# Patient Record
Sex: FEMALE | Race: OTHER
[De-identification: names, ages, dates, MRNs, and addresses within clinical notes are randomized per-mention and may not be internally consistent; named-entity substitution may affect disease eponyms.]

---

## 2018-03-15 ENCOUNTER — HOSPITAL ENCOUNTER (OUTPATIENT)
Dept: HOSPITAL 47 - ORWHC2ENDO | Age: 33
Discharge: HOME | End: 2018-03-15
Payer: COMMERCIAL

## 2018-03-15 VITALS — HEART RATE: 60 BPM

## 2018-03-15 VITALS — BODY MASS INDEX: 31.3 KG/M2

## 2018-03-15 VITALS — SYSTOLIC BLOOD PRESSURE: 110 MMHG | DIASTOLIC BLOOD PRESSURE: 71 MMHG

## 2018-03-15 VITALS — RESPIRATION RATE: 16 BRPM

## 2018-03-15 VITALS — TEMPERATURE: 98.1 F

## 2018-03-15 DIAGNOSIS — Z79.890: ICD-10-CM

## 2018-03-15 DIAGNOSIS — F32.9: ICD-10-CM

## 2018-03-15 DIAGNOSIS — K52.9: Primary | ICD-10-CM

## 2018-03-15 DIAGNOSIS — Z88.5: ICD-10-CM

## 2018-03-15 DIAGNOSIS — E07.9: ICD-10-CM

## 2018-03-15 DIAGNOSIS — Z79.899: ICD-10-CM

## 2018-03-15 LAB
ALBUMIN SERPL-MCNC: 3.9 G/DL (ref 3.5–5)
ALP SERPL-CCNC: 44 U/L (ref 38–126)
ALT SERPL-CCNC: 36 U/L (ref 9–52)
ANION GAP SERPL CALC-SCNC: 9 MMOL/L
AST SERPL-CCNC: 36 U/L (ref 14–36)
BUN SERPL-SCNC: 13 MG/DL (ref 7–17)
CALCIUM SPEC-MCNC: 9.4 MG/DL (ref 8.4–10.2)
CHLORIDE SERPL-SCNC: 107 MMOL/L (ref 98–107)
CO2 SERPL-SCNC: 24 MMOL/L (ref 22–30)
ERYTHROCYTE [DISTWIDTH] IN BLOOD BY AUTOMATED COUNT: 4.24 M/UL (ref 3.8–5.4)
ERYTHROCYTE [DISTWIDTH] IN BLOOD: 12.4 % (ref 11.5–15.5)
GLIADIN IGA SER-ACNC: 0.3 U/ML
GLUCOSE SERPL-MCNC: 73 MG/DL (ref 74–99)
HCT VFR BLD AUTO: 39.3 % (ref 34–46)
HGB BLD-MCNC: 13 GM/DL (ref 11.4–16)
MCH RBC QN AUTO: 30.6 PG (ref 25–35)
MCHC RBC AUTO-ENTMCNC: 33 G/DL (ref 31–37)
MCV RBC AUTO: 92.6 FL (ref 80–100)
PLATELET # BLD AUTO: 276 K/UL (ref 150–450)
POTASSIUM SERPL-SCNC: 4.5 MMOL/L (ref 3.5–5.1)
PROT SERPL-MCNC: 6.6 G/DL (ref 6.3–8.2)
SODIUM SERPL-SCNC: 140 MMOL/L (ref 137–145)
WBC # BLD AUTO: 8.8 K/UL (ref 3.8–10.6)

## 2018-03-15 PROCEDURE — 45380 COLONOSCOPY AND BIOPSY: CPT

## 2018-03-15 PROCEDURE — 88305 TISSUE EXAM BY PATHOLOGIST: CPT

## 2018-03-15 PROCEDURE — 83516 IMMUNOASSAY NONANTIBODY: CPT

## 2018-03-15 PROCEDURE — 81025 URINE PREGNANCY TEST: CPT

## 2018-03-15 PROCEDURE — 86140 C-REACTIVE PROTEIN: CPT

## 2018-03-15 PROCEDURE — 80053 COMPREHEN METABOLIC PANEL: CPT

## 2018-03-15 PROCEDURE — 85027 COMPLETE CBC AUTOMATED: CPT

## 2018-03-15 PROCEDURE — 85652 RBC SED RATE AUTOMATED: CPT

## 2018-03-15 NOTE — P.PCN
Date of Procedure: 03/15/18


Procedure(s) Performed: 


BRIEF HISTORY: Patient is a 32-year-old pleasant, white female, scheduled for 

an elective colonoscopy as a part of evaluation of chronic diarrhea for the 

last 6 months duration.  She has stool studies have been negative. 





PROCEDURE PERFORMED: Colonoscopy with random biopsies. 





PREOPERATIVE DIAGNOSIS: Diarrhea of 6 months duration.. 





IV sedation per Anesthesia. 





PROCEDURE: After informed consent was obtained, the patient, was brought into 

the endoscopy unit. IV sedation was administered by Anesthesia under continuous 

monitoring.  Digital rectal examination was normal. Initially the Olympus CF-

160 flexible video colonoscope was then inserted in the rectum, gradually 

advanced into the cecum without any difficulty. Careful examination was 

performed as the scope was gradually being withdrawn. Ileocecal valve and the 

appendiceal orifice were visualized and appeared normal.  Prep was excellent.  

Terminal ileum appeared normal.  Mucosa of the cecum, ascending colon, 

transverse colon, descending colon, sigmoid colon, and rectum appeared normal.  

Random biopsies were done from ascending and descending colon to rule out 

microscopic/collagenous colitis.  Retroflexion was performed in the rectum and 

no lesions were seen. The patient tolerated the procedure well. 





IMPRESSION: Normal-appearing colon from rectum to cecum with no evidence of 

colitis or colorectal neoplasia..





RECOMMENDATIONS:  Findings of this examination were discussed with the patient 

as well as her family.  She was advised to follow with the biopsy results.  She 

will be seen in office in 3-4 weeks.

## 2019-03-04 ENCOUNTER — HOSPITAL ENCOUNTER (OUTPATIENT)
Dept: HOSPITAL 47 - LABWHC1 | Age: 34
End: 2019-03-04
Payer: COMMERCIAL

## 2019-03-04 DIAGNOSIS — Z00.00: Primary | ICD-10-CM

## 2019-03-04 LAB
ALBUMIN SERPL-MCNC: 4.6 G/DL (ref 3.8–4.9)
ALBUMIN/GLOB SERPL: 2.19 G/DL (ref 1.6–3.17)
ALP SERPL-CCNC: 59 U/L (ref 41–126)
ALT SERPL-CCNC: 20 U/L (ref 8–44)
ANION GAP SERPL CALC-SCNC: 7.8 MMOL/L (ref 4–12)
AST SERPL-CCNC: 26 U/L (ref 13–35)
BASOPHILS # BLD AUTO: 0.1 K/UL (ref 0–0.2)
BASOPHILS NFR BLD AUTO: 1 %
BUN SERPL-SCNC: 15 MG/DL (ref 9–27)
CALCIUM SPEC-MCNC: 9.5 MG/DL (ref 8.7–10.3)
CHLORIDE SERPL-SCNC: 108 MMOL/L (ref 96–109)
CHOLEST SERPL-MCNC: 200 MG/DL (ref 0–200)
CO2 SERPL-SCNC: 24.2 MMOL/L (ref 21.6–31.8)
EOSINOPHIL # BLD AUTO: 0.2 K/UL (ref 0–0.7)
EOSINOPHIL NFR BLD AUTO: 2 %
ERYTHROCYTE [DISTWIDTH] IN BLOOD BY AUTOMATED COUNT: 4.63 M/UL (ref 3.8–5.4)
ERYTHROCYTE [DISTWIDTH] IN BLOOD: 12.4 % (ref 11.5–15.5)
GLOBULIN SER CALC-MCNC: 2.1 G/DL (ref 1.6–3.3)
GLUCOSE SERPL-MCNC: 81 MG/DL (ref 70–110)
HCT VFR BLD AUTO: 44.2 % (ref 34–46)
HDLC SERPL-MCNC: 55 MG/DL (ref 40–60)
HGB BLD-MCNC: 13.9 GM/DL (ref 11.4–16)
LDLC SERPL CALC-MCNC: 113.2 MG/DL (ref 0–131)
LYMPHOCYTES # SPEC AUTO: 3.5 K/UL (ref 1–4.8)
LYMPHOCYTES NFR SPEC AUTO: 37 %
MCH RBC QN AUTO: 30.1 PG (ref 25–35)
MCHC RBC AUTO-ENTMCNC: 31.5 G/DL (ref 31–37)
MCV RBC AUTO: 95.5 FL (ref 80–100)
MONOCYTES # BLD AUTO: 0.3 K/UL (ref 0–1)
MONOCYTES NFR BLD AUTO: 3 %
NEUTROPHILS # BLD AUTO: 5.4 K/UL (ref 1.3–7.7)
NEUTROPHILS NFR BLD AUTO: 57 %
PLATELET # BLD AUTO: 322 K/UL (ref 150–450)
POTASSIUM SERPL-SCNC: 4.1 MMOL/L (ref 3.5–5.5)
PROT SERPL-MCNC: 6.7 G/DL (ref 6.2–8.2)
SODIUM SERPL-SCNC: 140 MMOL/L (ref 135–145)
TRIGL SERPL-MCNC: 159 MG/DL (ref 0–149)
VLDLC SERPL CALC-MCNC: 31.8 MG/DL (ref 5–40)
WBC # BLD AUTO: 9.6 K/UL (ref 3.8–10.6)

## 2019-03-04 PROCEDURE — 80061 LIPID PANEL: CPT

## 2019-03-04 PROCEDURE — 85025 COMPLETE CBC W/AUTO DIFF WBC: CPT

## 2019-03-04 PROCEDURE — 84443 ASSAY THYROID STIM HORMONE: CPT

## 2019-03-04 PROCEDURE — 36415 COLL VENOUS BLD VENIPUNCTURE: CPT

## 2019-03-04 PROCEDURE — 80053 COMPREHEN METABOLIC PANEL: CPT

## 2019-08-01 ENCOUNTER — HOSPITAL ENCOUNTER (EMERGENCY)
Dept: HOSPITAL 47 - EC | Age: 34
Discharge: HOME | End: 2019-08-01
Payer: COMMERCIAL

## 2019-08-01 VITALS
HEART RATE: 74 BPM | RESPIRATION RATE: 16 BRPM | DIASTOLIC BLOOD PRESSURE: 59 MMHG | SYSTOLIC BLOOD PRESSURE: 105 MMHG | TEMPERATURE: 98.4 F
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
## 2019-08-01 DIAGNOSIS — O99.89: Primary | ICD-10-CM

## 2019-08-01 DIAGNOSIS — Z88.5: ICD-10-CM

## 2019-08-01 DIAGNOSIS — E07.9: ICD-10-CM
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
## 2019-08-01 DIAGNOSIS — Z79.899: ICD-10-CM
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
## 2019-08-01 DIAGNOSIS — O99.342: ICD-10-CM

## 2019-08-01 DIAGNOSIS — F32.9: ICD-10-CM

## 2019-08-01 DIAGNOSIS — O99.282: ICD-10-CM

## 2019-08-01 DIAGNOSIS — M25.561: ICD-10-CM

## 2019-08-01 DIAGNOSIS — Z3A.20: ICD-10-CM

## 2019-08-01 DIAGNOSIS — Z79.890: ICD-10-CM

## 2019-08-01 PROCEDURE — 99283 EMERGENCY DEPT VISIT LOW MDM: CPT

## 2019-08-01 NOTE — ED
Lower Extremity Injury HPI





- General


Chief Complaint: Extremity Injury, Lower


Stated Complaint: Fall, knee pain, IHS


Time Seen by Provider: 19 22:30


Source: patient


Mode of arrival: wheelchair


Limitations: no limitations





- History of Present Illness


Initial Comments: 


34-year-old female who is currently 20 weeks pregnant presenting today for chief

complaint of right knee pain after falling at work.  Patient states that she 

slipped on a wet floor at work.  She states that she hit the anterior aspect of 

her knee.  She states that there is no bruising or worsen if can swelling.  

Patient states the pain increases with flexing and extending the knee.  Patient 

states she is able to walk, this increases the pain.  Patient denies any pain at

the hip or ankle.  Patient denies any foot pain.  Patient states that she caught

herself she did not hit her abdomen.  Denies any abdominal pain.  Patient denies

any pain of the wrist bilaterally or upper extremities.  Remaining review of 

system negative no other complaints upon arrival patient appears well no signs 

acute distress ambulatory.








- Related Data


                                Home Medications











 Medication  Instructions  Recorded  Confirmed


 


Levothyroxine Sodium [Synthroid] 75 mcg PO DAILY 09/05/15 03/14/18


 


Pnv,Calcium 72/Iron/Folic Acid 1 each PO DAILY 09/05/15 03/14/18





[Prenatal Plus Tablet]   


 


Citalopram Hydrobromide 20 mg PO DAILY 18





[Citalopram HBr]   











                                    Allergies











Allergy/AdvReac Type Severity Reaction Status Date / Time


 


butorphanol [From Stadol] Allergy  Unknown Verified 19 22:27














Review of Systems


ROS Statement: 


Those systems with pertinent positive or pertinent negative responses have been 

documented in the HPI.





ROS Other: All systems not noted in ROS Statement are negative.





Past Medical History


Past Medical History: GERD/Reflux, Hyperlipidemia, Thyroid Disorder


Additional Past Medical History / Comment(s): migraines, refux during pregnancy,

diarrhea, hx kidney stones during pregnacy


History of Any Multi-Drug Resistant Organisms: None Reported


Past Surgical History: Breast Surgery,  Section


Additional Past Surgical History / Comment(s): dorothy breast reduction, carpal 

tunnel surgery


Past Anesthesia/Blood Transfusion Reactions: Motion Sickness


Additional Past Anesthesia/Blood Transfusion Reaction / Comment(s): had epidural

for C/S- "had cholinergic episode requiring atropine during epidural". was  

unresponsive and slept right through labor. possibly from stadol per pt.


Past Psychological History: Depression


Smoking Status: Never smoker


Past Alcohol Use History: None Reported


Past Drug Use History: None Reported





- Past Family History


  ** Mother


Family Medical History: No Reported History





  ** Father


Family Medical History: Cancer, Deep Vein Thrombosis (DVT), Myocardial 

Infarction (MI)


Additional Family Medical History / Comment(s): .





General Exam





- General Exam Comments


Initial Comments: 


General:  The patient is awake and alert, in no distress, and does not appear 

acutely ill. 


Eye:  Pupils are equal, round and reactive to light, extra-ocular movements are 

intact.  No nystagmus.  There is normal conjunctiva bilaterally.  No signs of 

icterus.  No raccoon or Monreal sign appreciated.


Cardiovascular:  There is a regular rate and rhythm. No murmur, rub or gallop is

appreciated.


Respiratory:  Lungs are clear to auscultation, respirations are non-labored, 

breath sounds are equal.  No wheezes, stridor, rales, or rhonchi.


Musculoskeletal: Upon inspection of the knees bilaterally.  There is no 

abrasions or lacerations.  There is mild soft tissue swelling over the anterior 

right knee.  Otherwise patient has no posterior tenderness.  Only tender to 

palpation over the anterior knee.  No noted gross deformity.  Strength preserved

at the knee joint hip and ankles bilaterally equal sensation intact the proximal

distal to injury site.  Dorsalis pedis pulses are equal bilaterally +2.  No 

evidence of footdrop.  


Neurological:  A&O x 3. CN II-XII intact grossly, There are no obvious motor or 

sensory deficits. Coordination appears grossly intact. Speech is normal.


Skin:  Skin is warm and dry and no rashes or lesions are noted. 


Psychiatric:  Cooperative, appropriate mood & affect, normal judgment.  





Limitations: no limitations





Course


                                   Vital Signs











  19





  22:21


 


Temperature 98.4 F


 


Pulse Rate 74


 


Respiratory 16





Rate 


 


Blood Pressure 105/59


 


O2 Sat by Pulse 98





Oximetry 














Medical Decision Making





- Medical Decision Making


Very pleasant 34-year-old female.  20 weeks pregnant.  Imaging studies obtained 

of right knee patient was shielded.  No acute osseous process noted.  Patient 

has intact extensor mechanism.  No neurovascular deficits.  No noted laxity.  

Patient appears well.  No other complaints or injuries.  Denies abdominal pain. 

No abdominal trauma.  At this time feel patient is stable for discharge with 

outpatient primary care follow-up.  If knee pain persists or she feels laxity in

the knee joint I recommended orthopedic evaluation.  Otherwise at this time 

after reviewing imaging studies of attending provider with the patient is stable

for discharge.  Patient is placed in an Ace bandage given Rice instructions and 

return parameters were discussed and patient was discharged ambulatory appearing

well








Disposition


Clinical Impression: 


 Right anterior knee pain, Fall





Disposition: HOME SELF-CARE


Condition: Good


Instructions (If sedation given, give patient instructions):  Knee Pain (ED), 

R.I.C.E. Treatment (ED)


Additional Instructions: 


Please use medication as discussed.  Please follow-up with family doctor in the 

next 2 days. Rest, ice and elevate knee as discussed.  Please return to 

emergency room if the symptoms increase or worsen or for any other concerns.


Is patient prescribed a controlled substance at d/c from ED?: No


Referrals: 


Edna Sharma MD [Primary Care Provider] - 1-2 days


Time of Disposition: 23:06

## 2019-08-01 NOTE — XR
EXAM:

  XR Right Knee, 3 views

 

CLINICAL HISTORY:

  ITS.REASON XR Reason: fall

 

TECHNIQUE:

  Three views of the right knee.

 

COMPARISON:

  No relevant prior studies available.

 

FINDINGS:

  Bones/joints:  No acute fracture.

  Soft tissues:  No radiopaque foreign body.

 

IMPRESSION:     

  No acute fracture.

## 2019-10-24 ENCOUNTER — HOSPITAL ENCOUNTER (OUTPATIENT)
Dept: HOSPITAL 47 - RADECHMAIN | Age: 34
Discharge: HOME | End: 2019-10-24
Attending: OBSTETRICS & GYNECOLOGY
Payer: COMMERCIAL

## 2019-10-24 DIAGNOSIS — R00.0: ICD-10-CM

## 2019-10-24 DIAGNOSIS — O99.412: Primary | ICD-10-CM

## 2019-10-24 PROCEDURE — 93225 XTRNL ECG REC<48 HRS REC: CPT

## 2019-10-24 PROCEDURE — 93226 XTRNL ECG REC<48 HR SCAN A/R: CPT

## 2019-10-28 ENCOUNTER — HOSPITAL ENCOUNTER (EMERGENCY)
Dept: HOSPITAL 47 - EC | Age: 34
Discharge: HOME | End: 2019-10-28
Payer: COMMERCIAL

## 2019-10-28 VITALS
DIASTOLIC BLOOD PRESSURE: 67 MMHG | HEART RATE: 95 BPM | TEMPERATURE: 98.6 F | RESPIRATION RATE: 18 BRPM | SYSTOLIC BLOOD PRESSURE: 127 MMHG
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
## 2019-10-28 DIAGNOSIS — O99.283: ICD-10-CM

## 2019-10-28 DIAGNOSIS — Z79.890: ICD-10-CM

## 2019-10-28 DIAGNOSIS — Z98.890: ICD-10-CM
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
## 2019-10-28 DIAGNOSIS — S01.85XA: ICD-10-CM

## 2019-10-28 DIAGNOSIS — Z88.5: ICD-10-CM
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
## 2019-10-28 DIAGNOSIS — O99.343: ICD-10-CM

## 2019-10-28 DIAGNOSIS — E07.9: ICD-10-CM

## 2019-10-28 DIAGNOSIS — Z3A.33: ICD-10-CM

## 2019-10-28 DIAGNOSIS — Z79.899: ICD-10-CM

## 2019-10-28 DIAGNOSIS — O9A.213: Primary | ICD-10-CM

## 2019-10-28 DIAGNOSIS — S01.551A: ICD-10-CM
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
## 2019-10-28 DIAGNOSIS — F32.9: ICD-10-CM

## 2019-10-28 DIAGNOSIS — S01.25XA: ICD-10-CM

## 2019-10-28 PROCEDURE — 99283 EMERGENCY DEPT VISIT LOW MDM: CPT

## 2019-10-28 PROCEDURE — 12011 RPR F/E/E/N/L/M 2.5 CM/<: CPT

## 2019-10-31 ENCOUNTER — HOSPITAL ENCOUNTER (EMERGENCY)
Dept: HOSPITAL 47 - EC | Age: 34
Discharge: HOME | End: 2019-10-31
Payer: COMMERCIAL

## 2019-10-31 VITALS
HEART RATE: 94 BPM | RESPIRATION RATE: 18 BRPM | TEMPERATURE: 97.8 F | DIASTOLIC BLOOD PRESSURE: 78 MMHG | SYSTOLIC BLOOD PRESSURE: 130 MMHG
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
## 2019-10-31 DIAGNOSIS — Z79.891: ICD-10-CM

## 2019-10-31 DIAGNOSIS — Z88.5: ICD-10-CM

## 2019-10-31 DIAGNOSIS — F32.9: ICD-10-CM

## 2019-10-31 DIAGNOSIS — S01.551D: Primary | ICD-10-CM

## 2019-10-31 DIAGNOSIS — E07.9: ICD-10-CM

## 2019-10-31 DIAGNOSIS — Z79.899: ICD-10-CM

## 2019-10-31 DIAGNOSIS — Z79.890: ICD-10-CM

## 2019-10-31 DIAGNOSIS — M79.644: ICD-10-CM
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
## 2019-10-31 DIAGNOSIS — S01.25XD: ICD-10-CM
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
## 2019-10-31 DIAGNOSIS — M79.89: ICD-10-CM

## 2019-10-31 PROCEDURE — 99284 EMERGENCY DEPT VISIT MOD MDM: CPT

## 2019-10-31 NOTE — XR
EXAMINATION TYPE: XR hand complete RT

 

DATE OF EXAM: 10/31/2019

 

CLINICAL HISTORY: pain

 

TECHNIQUE:  Frontal, lateral and oblique images of the right hand are obtained.

 

COMPARISON: None.

 

FINDINGS:  There is no acute fracture/dislocation evident. The joint spaces appear within normal limi
ts.  The overlying soft tissue appears unremarkable.

 

IMPRESSION: 

 

There is no acute fracture or dislocation

 

ICD 10 NO FRACTURE, INITIAL EVALUATION

## 2019-10-31 NOTE — ED
General Adult HPI





- General


Chief complaint: Extremity Injury, Upper


Stated complaint: finger injury-IHS


Time Seen by Provider: 10/31/19 14:29


Source: patient, RN notes reviewed, old records reviewed


Mode of arrival: ambulatory


Limitations: no limitations





- History of Present Illness


Initial comments: 


34-year-old female patient presents to the chief complaint of hand pain.  

Patient was seen approximately 3 days ago for a dog bite.  Patient was bitten in

the face.  These wounds were reportedly irrigated, closed.  Patient not having 

any complaints regarding the bite.  Patient does report that she is now having 

pain at the distal aspect of the fourth digit on her right hand.  Patient 

reports that during the reported dog bite she fell to the ground an outstretched

arm.  Patient has full range of motion reports some swelling at the DIP joint.  

Also requests recheck of wounds.  Denies any erythema, drainage, systemic 

complaints.





Systemic: Pt denies fatigue, fever/chills, rash. Pt denies weakness, night 

sweats, weight loss. 


Neuro: Pt denies headache, visual disturbances, syncope or pre-syncope.


HEENT: Pt denies ocular discharge or irritation, otalgia, rhinorrhea, 

pharyngitis or notable lymphadenopathy. 


Cardiopulmonary: Pt denies chest pain, SOB, heart palpitations, dyspnea on 

exertion.  


Abdominal/GI: Pt denies abdominal pain, n/v/d. 


: Pt denies dysuria, burning w/ urination, frequency/urgency. Denies new onset

urinary or bowel incontinence.  


MSK: Pt denies myalgia, loss of strength or function in extremities. 


Neuro: Pt denies new onset weakness, paresthesias. 














- Related Data


                                Home Medications











 Medication  Instructions  Recorded  Confirmed


 


Levothyroxine Sodium [Synthroid] 75 mcg PO DAILY 09/05/15 10/31/19


 


Pnv,Calcium 72/Iron/Folic Acid 1 tab PO HS 09/05/15 10/31/19





[Prenatal Plus Tablet]   


 


Acetaminophen Tab [Tylenol Tab] 500 mg PO BID 10/31/19 10/31/19


 


Ferrous Sulfate [Feosol] 325 mg PO HS 10/31/19 10/31/19


 


Sertraline [Zoloft] 150 mg PO DAILY 10/31/19 10/31/19








                                  Previous Rx's











 Medication  Instructions  Recorded


 


Amoxicillin/Potassium Clav 1 tab PO Q12HR #20 tab 10/28/19





[Augmentin 875-125 Tablet]  











                                    Allergies











Allergy/AdvReac Type Severity Reaction Status Date / Time


 


butorphanol [From Stadol] Allergy  PASSED OUT Verified 10/31/19 14:34














Review of Systems


ROS Statement: 


Those systems with pertinent positive or pertinent negative responses have been 

documented in the HPI.





ROS Other: All systems not noted in ROS Statement are negative.





Past Medical History


Past Medical History: GERD/Reflux, Hyperlipidemia, Thyroid Disorder


Additional Past Medical History / Comment(s): migraines, refux during pregnancy,

diarrhea, hx kidney stones during pregnacy


History of Any Multi-Drug Resistant Organisms: None Reported


Past Surgical History: Breast Surgery,  Section


Additional Past Surgical History / Comment(s): dorothy breast reduction, carpal 

tunnel surgery


Past Anesthesia/Blood Transfusion Reactions: Motion Sickness


Additional Past Anesthesia/Blood Transfusion Reaction / Comment(s): had epidural

for C/S- "had cholinergic episode requiring atropine during epidural". was  

unresponsive and slept right through labor. possibly from stadol per pt.


Past Psychological History: Depression


Smoking Status: Never smoker


Past Alcohol Use History: None Reported


Past Drug Use History: None Reported





- Past Family History


  ** Mother


Family Medical History: No Reported History





  ** Father


Family Medical History: Cancer, Deep Vein Thrombosis (DVT), Myocardial 

Infarction (MI)


Additional Family Medical History / Comment(s): .





General Exam


Limitations: no limitations





Course


                                   Vital Signs











  10/31/19 10/31/19





  14:25 15:11


 


Temperature 97.8 F 


 


Pulse Rate 94 


 


Respiratory 18 18





Rate  


 


Blood Pressure 130/78 


 


O2 Sat by Pulse 98 





Oximetry  














Medical Decision Making





- Medical Decision Making








34-year-old female patient presents ED chief complaint of recheck of dog bite 

wound as well as hand pain in the hand. Pt VSS, afebrile. Physical exam 

displayed: Fourth DIP joint of right hand mildly tender and swollen.  No 

erythema.  Range of motion is intact of all digits MCP PIP/DIP joints.  

Sensation intact.  Capillary refill less than 2 seconds.  No snuffbox 

tenderness.  Healing bites noted on lips, anterior nose region.  Non-

erythematous, no discharge, no signs of infection, no streaking.  Plain film of 

hand is negative.  Patient placed in a straight finger splint.  Patient dischar

ged with outpatient follow-up with primary care provider next week.  Will return

here immediately if signs of infection develop.  If condition worsens in any 

way. Case discussed with Dr. Garcia. 











Disposition


Clinical Impression: 


 Encounter for wound re-check, Finger pain, right





Disposition: HOME SELF-CARE


Condition: Stable


Instructions (If sedation given, give patient instructions):  Finger Sprain (ED)


Additional Instructions: 


Follow-up with primary care provider next week.  Follow up with orthopedic 

consult of symptoms and hand persists.  Monitor for signs and symptoms of 

infection of dog bites.  If any of these develop return immediately to ER.





Please monitor for signs and symptoms of infection including: redness, warmth, 

drainage, discharge. 





Please return to ED if these signs or symptoms occur, new signs or symptoms 

develop or if condition worsens in anyway. 


Is patient prescribed a controlled substance at d/c from ED?: No


Referrals: 


Edna Sharma MD [Primary Care Provider] - 1-2 days


Tye Fowler DO [Medical Doctor] - 1-2 days

## 2019-11-01 NOTE — HM
HOLTER MONITOR REPORT



A 24 HOUR HOLTER REPORT:

Patient in her diary had 3 episodes of palpitations that she documented at 9:32 am,

10:30 am and 10:38 am.  Predominant rhythm was sinus with average heart rate of 90

beats per minute.  There is no evidence of any significant SVT, VT, or bradyarrhythmia.

At the time she complained of having palpitation, she was in a sinus rhythm and sinus

tachycardia.  No significant arrhythmia was detected when she felt palpitations.  There

was no bradyarrhythmia of significance.



IMPRESSION:

Predominant sinus.  There was no correlation of any arrhythmia when she felt

palpitations on three occasions.  Premature atrial contractions and premature

ventricular contractions are noted very rarely and very infrequently.  There is no

evidence to suggest any bradyarrhythmia.





MMODL / IJN: [id number] / Job#: [id number]

## 2019-12-12 ENCOUNTER — HOSPITAL ENCOUNTER (INPATIENT)
Dept: HOSPITAL 47 - 4FBP | Age: 34
LOS: 4 days | Discharge: HOME | End: 2019-12-16
Attending: OBSTETRICS & GYNECOLOGY | Admitting: OBSTETRICS & GYNECOLOGY
Payer: COMMERCIAL

## 2019-12-12 VITALS — BODY MASS INDEX: 31.3 KG/M2

## 2019-12-12 DIAGNOSIS — E78.5: ICD-10-CM

## 2019-12-12 DIAGNOSIS — Z79.899: ICD-10-CM

## 2019-12-12 DIAGNOSIS — Z86.69: ICD-10-CM

## 2019-12-12 DIAGNOSIS — Z79.890: ICD-10-CM

## 2019-12-12 DIAGNOSIS — Z83.2: ICD-10-CM

## 2019-12-12 DIAGNOSIS — F32.9: ICD-10-CM

## 2019-12-12 DIAGNOSIS — Z88.8: ICD-10-CM

## 2019-12-12 DIAGNOSIS — L29.9: ICD-10-CM

## 2019-12-12 DIAGNOSIS — E03.9: ICD-10-CM

## 2019-12-12 DIAGNOSIS — Z82.49: ICD-10-CM

## 2019-12-12 DIAGNOSIS — Z87.442: ICD-10-CM

## 2019-12-12 DIAGNOSIS — O34.211: Primary | ICD-10-CM

## 2019-12-12 DIAGNOSIS — K21.9: ICD-10-CM

## 2019-12-12 DIAGNOSIS — Z98.890: ICD-10-CM

## 2019-12-12 DIAGNOSIS — N85.8: ICD-10-CM

## 2019-12-12 DIAGNOSIS — Z3A.39: ICD-10-CM

## 2019-12-12 DIAGNOSIS — Z80.9: ICD-10-CM

## 2019-12-12 LAB
BASOPHILS # BLD AUTO: 0 K/UL (ref 0–0.2)
BASOPHILS NFR BLD AUTO: 0 %
EOSINOPHIL # BLD AUTO: 0.1 K/UL (ref 0–0.7)
EOSINOPHIL NFR BLD AUTO: 1 %
ERYTHROCYTE [DISTWIDTH] IN BLOOD BY AUTOMATED COUNT: 3.99 M/UL (ref 3.8–5.4)
ERYTHROCYTE [DISTWIDTH] IN BLOOD: 15.1 % (ref 11.5–15.5)
GLUCOSE BLD-MCNC: 73 MG/DL (ref 75–99)
HBA1C MFR BLD: 5.2 % (ref 4–6)
HCT VFR BLD AUTO: 37.4 % (ref 34–46)
HGB BLD-MCNC: 12.2 GM/DL (ref 11.4–16)
LYMPHOCYTES # SPEC AUTO: 2.1 K/UL (ref 1–4.8)
LYMPHOCYTES NFR SPEC AUTO: 24 %
MCH RBC QN AUTO: 30.7 PG (ref 25–35)
MCHC RBC AUTO-ENTMCNC: 32.7 G/DL (ref 31–37)
MCV RBC AUTO: 93.8 FL (ref 80–100)
MONOCYTES # BLD AUTO: 0.3 K/UL (ref 0–1)
MONOCYTES NFR BLD AUTO: 4 %
NEUTROPHILS # BLD AUTO: 6.2 K/UL (ref 1.3–7.7)
NEUTROPHILS NFR BLD AUTO: 70 %
PLATELET # BLD AUTO: 213 K/UL (ref 150–450)
WBC # BLD AUTO: 8.8 K/UL (ref 3.8–10.6)

## 2019-12-12 PROCEDURE — 86900 BLOOD TYPING SEROLOGIC ABO: CPT

## 2019-12-12 PROCEDURE — 85025 COMPLETE CBC W/AUTO DIFF WBC: CPT

## 2019-12-12 PROCEDURE — 88307 TISSUE EXAM BY PATHOLOGIST: CPT

## 2019-12-12 PROCEDURE — 86901 BLOOD TYPING SEROLOGIC RH(D): CPT

## 2019-12-12 PROCEDURE — 86850 RBC ANTIBODY SCREEN: CPT

## 2019-12-12 PROCEDURE — 83036 HEMOGLOBIN GLYCOSYLATED A1C: CPT

## 2019-12-12 RX ADMIN — POTASSIUM CHLORIDE SCH MLS/HR: 14.9 INJECTION, SOLUTION INTRAVENOUS at 11:13

## 2019-12-12 NOTE — P.OP
Date of Procedure: 19


Preoperative Diagnosis: 


#1.  Previous  section, requesting repeat #2.  Gestational diabetes


Postoperative Diagnosis: 


Same


Procedure(s) Performed: 


#1.  Repeat low transverse  section


Anesthesia: spinal


Surgeon: Earl Flores


Assistant #1: Chioma Brito


Estimated Blood Loss (ml): 500


IV fluids (ml): 1,100


Urine output (ml): 100


Pathology: other (Placenta)


Condition: stable


Disposition: floor


Operative Findings: 


Preoperatively, the patient been thoroughly counseled regarding the procedure 

and agreed to proceed.  She was taken the operating room where she was delivered

of a viable 7 lbs. 12 oz. baby boy with Apgars of 3 at 1 minute, 6 at 5 minutes,

and 7 at 10 minutes.  The infant was delivered in the occiput anterior position 

with the help of a mighty Vac vacuum secondary to the angle of the pelvis and 

the head floating well above the pelvis.  Fetal cord blood was collected per 

routine with the kit provided by the patient.  The placenta was delivered 

manually, intact, and grossly normal with a grossly normal three-vessel cord.  

The uterus, tubes, and ovaries were entirely normal to inspection.


Description of Procedure: 


The patient was prepped and draped in usual fashion after spinal anesthesia was 

administered by the anesthesiologist.  A Pfannenstiel incision was made through 

pre-existing scar and extended into the abdominal cavity without difficulty.  

There was only a mild amount of scarring at the level of the fascia and 

subcutaneous tissues.  The bladder peritoneum was elevated, incised, and 

reflected distally.  A 2 cm incision was made in the transverse plane of the 

lower uterine segment to enter the uterus at which time clear fluid was noted.  

There was a moderate to significant amount of fluid.  The incision was extended 

in both directions using the bandage scissors.  The fetal head was encountered 

floating in the pelvis and attempts to deliver the head in standard fashion were

very difficult secondary to the head floating and the angle the pelvis.  As 

result, a mighty Vac vacuum was affixed to the occiput of the infant at which 

time the head was able to be delivered up and through the incision where the 

nose and mouth were thoroughly suctioned.  Remainder of the infant was delivered

onto the field where the cord was doubly clamped, cut, and the infant passed for

resuscitative measures with weight and Apgars as noted above.  The umbilical 

cord was then prepped sterilely and cord blood taken for standard instructions 

the included in the kit provided by the patient.  Once the entire volume of cord

blood had been collected, the kit was closed appropriately and set aside.  The 

placenta was delivered manually and intact as noted above there was a portion of

the placenta lodged into the right cornu of the uterus which was ultimately 

removed.  The uterus was exteriorized and the interior cavity of the uterus 

swept of any remaining placental or membranous fragments.  The margins of the 

incision were grasped with [place] clamps and the incision was closed in 2 

layers.  The first layer was a running locking stitch of 0 chromic catgut 

followed by a running imbricating layer of 0 chromic catgut.  Any small points 

of bleeding thereafter made hemostatic with the Bovie.  The posterior cul-de-sac

was suctioned using a guard and the uterine and ovarian findings were normal as 

noted above.  The uterus was replaced within the abdominal cavity and the 

gutters swept of any remaining blood, fluid, or clot.  The incision was 

reexamined and again, any small points of bleeding were made hemostatic with the

Bovie.  Once hemostasis was established, the parietal peritoneum was loosely 

reapproximated and layer of muscles examined and found to be hemostatic.  The 

fascia was closed with 2 running stitches of 0 Vicryl proceeding from the 

lateral margins to the midpoint.  The subcutaneous tissues were irrigated, made 

hemostatic with the Bovie, and reapproximated with a running stitch of 3-0 

chromic catgut.  The skin was retracted approximate with a running subcuticular 

stitch of 4-0 Vicryl followed by half-inch Steri-Strips placed with Mastisol.  

Estimated blood loss for the case was approximate 500 mL.  There were no 

complications and there is no apparent explanation for the infant's initial 

difficulties with breathing spontaneously.  All sponge, instrument, and needle 

counts were correct.  Both mother and infant are now resting comfortably in 

recovery though the infant remains in the special care nursery for close 

observation.

## 2019-12-12 NOTE — P.HPOB
History of Present Illness


H&P Date: 19


Chief Complaint: 39+ weeks, previous  section, requesting repeat





The patient is a 34-year-old  2 para 1001 who presents to the hospital at

39-2/7 weeks for repeat low transverse  section.  Her first pregnancy 

resulted in a primary low-transverse  section for arrest of dilation and

descent.  Her procedure was complicated by either a medication reaction or 

anesthetic reaction and was presumed to be a college anergic crisis which 

resolved with medications.  This pregnancy has been relatively uncomplicated 

though she has been diagnosed as a gestational diabetic and has had relatively 

reasonable blood sugar control.  There were no other complications.  Group B 

strep status is negative.





Obstetrical history:  2 para 1001 with 1 previous  section as 

noted above, complications as noted above.  Current pregnancy statistics are 

listed in history present illness.  EDC is 2019 established by a early 

ultrasound.  Laboratory workup demonstrates a blood type of A+ with a negative 

antibody screen.  Remainder of the laboratory workup was within normal limits.  

One hour Glucola was elevated and followed by an abnormal 3 hour glucose 

tolerance test.  Group B strep status is negative.





Gynecologic history: Unremarkable with no history of any infections to include 

STDs.  





Review of Systems





Review of systems is confined to history of present illness.





Past Medical History


Past Medical History: GERD/Reflux, Hyperlipidemia, Thyroid Disorder


Additional Past Medical History / Comment(s): migraines, refux during pregnancy,

diarrhea, hx kidney stones during pregnacy


History of Any Multi-Drug Resistant Organisms: None Reported


Past Surgical History: Breast Surgery,  Section, Orthopedic Surgery


Additional Past Surgical History / Comment(s): dorothy breast reduction, carpal 

tunnel surgery-BILAT


Past Anesthesia/Blood Transfusion Reactions: Motion Sickness


Additional Past Anesthesia/Blood Transfusion Reaction / Comment(s): had epidural

for C/S- "had cholinergic episode requiring atropine during epidural". was  

unresponsive and slept right through labor. possibly from stadol per pt.


Past Psychological History: Depression


Additional Psychological History / Comment(s): post partum


Smoking Status: Never smoker


Past Alcohol Use History: None Reported


Past Drug Use History: None Reported





- Past Family History


  ** Mother


Family Medical History: No Reported History





  ** Father


Family Medical History: Cancer, Deep Vein Thrombosis (DVT), Myocardial 

Infarction (MI)


Additional Family Medical History / Comment(s): .





Medications and Allergies


                                Home Medications











 Medication  Instructions  Recorded  Confirmed  Type


 


Levothyroxine Sodium [Synthroid] 75 mcg PO DAILY 09/05/15 12/11/19 History


 


Pnv,Calcium 72/Iron/Folic Acid 1 tab PO HS 09/05/15 12/11/19 History





[Prenatal Plus Tablet]    


 


Ferrous Sulfate [Feosol] 325 mg PO HS 10/31/19 12/11/19 History


 


Sertraline [Zoloft] 150 mg PO DAILY 10/31/19 12/11/19 History








                                    Allergies











Allergy/AdvReac Type Severity Reaction Status Date / Time


 


butorphanol [From Stadol] Allergy  PASSED OUT Verified 19 09:09














Exam


                                   Vital Signs











  Temp Pulse Resp BP Pulse Ox


 


 19 10:29  97.7 F  77  16  125/62  99








                                Intake and Output











 19





 22:59 06:59 14:59


 


Other:   


 


  Weight   72.575 kg














In general, this is a well-developed, well-nourished  female in no acute

distress.  Her heart has a regular rhythm and rate without murmur.  Her lungs 

are clear to auscultation bilaterally in all fields.  Her abdomen is gravid, 

nondistended, has normal active bowel sounds, is soft, nontender, and without 

any palpable masses aside from uterine fundus.  Her extremities are without any 

cyanosis, clubbing, or significant edema and are nontender to palpation 

bilaterally.  Digital cervical examination is deferred.





Results


Result Diagrams: 


                                 19 11:04





                  Abnormal Lab Results - Last 24 Hours (Table)











  19 Range/Units





  11:18 


 


POC Glucose (mg/dL)  73 L  (75-99)  mg/dL














Assessment and Plan


(1) Previous  section


Current Visit: Yes   Status: Acute   Code(s): Z98.891 - HISTORY OF UTERINE SCAR 

FROM PREVIOUS SURGERY   SNOMED Code(s): 693788203


   





(2) Term pregnancy


Current Visit: Yes   Status: Acute   Code(s): Z34.80 - ENCOUNTER FOR SUPRVSN OF 

NORMAL PREGNANCY, UNSP TRIMESTER   SNOMED Code(s): 70649759


   


Plan: 





The patient is to be taken the operating room for repeat low transverse 

section.  The risks and complications of the procedure have been thoroughly 

discussed and she has understood and agreed to proceed.  Anesthesia has been 

made aware of the previous complications and is prepared to manage similar 

happenings.

## 2019-12-13 LAB
BASOPHILS # BLD AUTO: 0 K/UL (ref 0–0.2)
BASOPHILS NFR BLD AUTO: 0 %
EOSINOPHIL # BLD AUTO: 0.1 K/UL (ref 0–0.7)
EOSINOPHIL NFR BLD AUTO: 1 %
ERYTHROCYTE [DISTWIDTH] IN BLOOD BY AUTOMATED COUNT: 3.32 M/UL (ref 3.8–5.4)
ERYTHROCYTE [DISTWIDTH] IN BLOOD: 15 % (ref 11.5–15.5)
HCT VFR BLD AUTO: 31.4 % (ref 34–46)
HGB BLD-MCNC: 10.4 GM/DL (ref 11.4–16)
LYMPHOCYTES # SPEC AUTO: 1.8 K/UL (ref 1–4.8)
LYMPHOCYTES NFR SPEC AUTO: 17 %
MCH RBC QN AUTO: 31.2 PG (ref 25–35)
MCHC RBC AUTO-ENTMCNC: 33 G/DL (ref 31–37)
MCV RBC AUTO: 94.7 FL (ref 80–100)
MONOCYTES # BLD AUTO: 0.4 K/UL (ref 0–1)
MONOCYTES NFR BLD AUTO: 4 %
NEUTROPHILS # BLD AUTO: 8 K/UL (ref 1.3–7.7)
NEUTROPHILS NFR BLD AUTO: 77 %
PLATELET # BLD AUTO: 163 K/UL (ref 150–450)
WBC # BLD AUTO: 10.3 K/UL (ref 3.8–10.6)

## 2019-12-13 RX ADMIN — POTASSIUM CHLORIDE SCH: 14.9 INJECTION, SOLUTION INTRAVENOUS at 12:05

## 2019-12-13 RX ADMIN — DOCUSATE SODIUM AND SENNOSIDES SCH: 50; 8.6 TABLET ORAL at 12:05

## 2019-12-13 RX ADMIN — DOCUSATE SODIUM AND SENNOSIDES SCH EACH: 50; 8.6 TABLET ORAL at 17:00

## 2019-12-13 RX ADMIN — POTASSIUM CHLORIDE SCH: 14.9 INJECTION, SOLUTION INTRAVENOUS at 12:06

## 2019-12-13 RX ADMIN — DOCUSATE SODIUM AND SENNOSIDES SCH: 50; 8.6 TABLET ORAL at 04:06

## 2019-12-13 RX ADMIN — DIMETHICONE PRN MG: 80 TABLET, CHEWABLE ORAL at 20:32

## 2019-12-13 RX ADMIN — IBUPROFEN PRN MG: 600 TABLET ORAL at 14:19

## 2019-12-13 RX ADMIN — IBUPROFEN PRN MG: 600 TABLET ORAL at 20:31

## 2019-12-13 RX ADMIN — POTASSIUM CHLORIDE SCH: 14.9 INJECTION, SOLUTION INTRAVENOUS at 20:44

## 2019-12-13 NOTE — P.PNOBGPC
Subjective





- Subjective


Patient reports: Reports appetite normal, Reports voiding normally, Reports pain

well controlled, Reports ambulating normally


[place]: doing well, in NICU (for supplemental oxygen currently.)





Objective





- Vital Signs


Latest vital signs: 


                                   Vital Signs











  Temp Pulse Resp BP Pulse Ox


 


 19 08:00  98 F  83  16  105/62  98


 


 19 04:00  98.2 F  68  16  106/68 


 


 19 03:00    16  


 


 19 01:00    16  


 


 19 00:00  98.0 F  75  16  100/80  100


 


 19 23:00    16  


 


 19 21:00    16  


 


 19 20:00  98.0 F  72  18  97/82 


 


 19 19:00    16  


 


 19 17:59      98


 


 19 17:00    16  


 


 19 15:59    16  


 


 19 15:00   68  16  110/52  100


 


 19 14:30   68  16  102/51  100


 


 19 14:00  97.1 F L  61  16  113/57  100


 


 19 13:59    16   98


 


 19 13:45   65  16  123/60  100


 


 19 13:30   65  16  125/65  100


 


 19 13:15   68  16  125/69  98


 


 19 13:00  96.6 F L  71  16  120/60  100


 


 19 12:59    16   100


 


 19 10:29  97.7 F  77  16  125/62  99








                                Intake and Output











 19





 22:59 06:59 14:59


 


Intake Total 100  


 


Output Total 600 900 


 


Balance -500 -900 


 


Intake:   


 


  Oral 100  


 


Output:   


 


  Urine 600 900 


 


    Uretheral (Watt) 300  


 


Other:   


 


  Voiding Method Indwelling Catheter  


 


  # Voids  2 2














- Exam


Extremities: Present: normal


Abdomen: Present: normal appearance, soft.  Absent: distention, tenderness


Incision: Present: normal, dry, intact


Uterus: Present: normal, firm (the uterine fundus is tonic and appropriately 

tender below the umbilicus.)





- Labs


Labs: 


                  Abnormal Lab Results - Last 24 Hours (Table)











  19 Range/Units





  11:18 06:21 


 


RBC   3.32 L  (3.80-5.40)  m/uL


 


Hgb   10.4 L  (11.4-16.0)  gm/dL


 


Hct   31.4 L  (34.0-46.0)  %


 


Neutrophils #   8.0 H  (1.3-7.7)  k/uL


 


POC Glucose (mg/dL)  73 L   (75-99)  mg/dL














Assessment and Plan


(1) Previous  section


Current Visit: Yes   Status: Acute   Code(s): Z98.891 - HISTORY OF UTERINE SCAR 

FROM PREVIOUS SURGERY   SNOMED Code(s): 732315826


   





(2) Term pregnancy


Current Visit: Yes   Status: Acute   Code(s): Z34.80 - ENCOUNTER FOR SUPRVSN OF 

NORMAL PREGNANCY, UNSP TRIMESTER   SNOMED Code(s): 72588353


   





(3) S/P  section


Current Visit: Yes   Status: Acute   Code(s): Z98.89 - OTHER SPECIFIED 

POSTPROCEDURAL STATES * DO NOT USE *   SNOMED Code(s): 740658963


   


Plan: 





continue routine postoperative care.  I have encouraged the patient amulet in 

the hallways routinely.  She is otherwise performing all activities of daily 

living and her pain is well controlled at this time.  Discharge will likely be 

dependent upon the length of stay for her infant.

## 2019-12-13 NOTE — P.PN
Progress Note - Text


Progress Note Date: 19


Patient doing well.  Ambulating without paresthesia or weakness.  Denies 

headache.  Pain controlled.  Pruritis treated.





VSS


Back - spinal site c/d





A/P POD#1 s/p 


- doing well

## 2019-12-14 RX ADMIN — DIMETHICONE PRN MG: 80 TABLET, CHEWABLE ORAL at 13:53

## 2019-12-14 RX ADMIN — IBUPROFEN PRN MG: 600 TABLET ORAL at 13:51

## 2019-12-14 RX ADMIN — IBUPROFEN PRN MG: 600 TABLET ORAL at 08:12

## 2019-12-14 RX ADMIN — DIMETHICONE PRN MG: 80 TABLET, CHEWABLE ORAL at 00:46

## 2019-12-14 RX ADMIN — DOCUSATE SODIUM AND SENNOSIDES SCH: 50; 8.6 TABLET ORAL at 08:12

## 2019-12-14 RX ADMIN — IBUPROFEN PRN MG: 600 TABLET ORAL at 02:44

## 2019-12-14 RX ADMIN — IBUPROFEN PRN MG: 600 TABLET ORAL at 20:47

## 2019-12-14 NOTE — P.PNOBGPC
Subjective





- Subjective


Principal diagnosis: postop day 2


Interval history: 





feeling well, pain controlled with oral pain medications.


Patient reports: Reports appetite normal, Reports voiding normally, Reports pain

well controlled, Reports ambulating normally, Denies dizzy ambulation


[place]: in NICU





Objective





- Vital Signs


Latest vital signs: 


                                   Vital Signs











  Temp Pulse Resp BP Pulse Ox


 


 19 23:16  98.5 F  84  16  102/57  98


 


 19 16:00  97.8 F  67  18  127/74 


 


 19 12:00  98.4 F  76  16  104/58  98








                                Intake and Output











 19





 22:59 06:59 14:59


 


Other:   


 


  # Voids 2 2 














- Exam


Extremities: Present: normal.  Absent: edema


Abdomen: Present: normal appearance, soft.  Absent: distention, tenderness


Incision: Present: normal, dry, intact.  Absent: erythematous, edematous


Uterus: Present: normal, firm





Assessment and Plan


(1) Previous  section


Current Visit: Yes   Status: Acute   Code(s): Z98.891 - HISTORY OF UTERINE SCAR 

FROM PREVIOUS SURGERY   SNOMED Code(s): 205576419


   





(2) Term pregnancy


Current Visit: Yes   Status: Acute   Code(s): Z34.80 - ENCOUNTER FOR SUPRVSN OF 

NORMAL PREGNANCY, UNSP TRIMESTER   SNOMED Code(s): 56617780


   





(3) Gestational diabetes


Current Visit: Yes   Status: Acute   Code(s): O24.419 - GESTATIONAL DIABETES 

MELLITUS IN PREGNANCY, UNSP CONTROL   SNOMED Code(s): 21432889


   


Plan: 





postoperative days 2 status post repeat low transverse  section.  Infant

in the special care nursery for possible pneumonia.  She is pumping without 

difficulty and recovering well otherwise.  Routine care.

## 2019-12-15 RX ADMIN — IBUPROFEN PRN MG: 600 TABLET ORAL at 06:20

## 2019-12-15 RX ADMIN — DOCUSATE SODIUM AND SENNOSIDES SCH: 50; 8.6 TABLET ORAL at 08:43

## 2019-12-15 RX ADMIN — IBUPROFEN PRN MG: 600 TABLET ORAL at 15:16

## 2019-12-15 RX ADMIN — ACETAMINOPHEN PRN MG: 325 TABLET, FILM COATED ORAL at 18:21

## 2019-12-15 RX ADMIN — IBUPROFEN PRN MG: 600 TABLET ORAL at 21:15

## 2019-12-15 RX ADMIN — DOCUSATE SODIUM AND SENNOSIDES SCH: 50; 8.6 TABLET ORAL at 05:05

## 2019-12-15 NOTE — P.PNOBGPC
Subjective





- Subjective


Principal diagnosis: postop day 3


Interval history: 





feeling well


Patient reports: Reports appetite normal, Reports voiding normally, Reports pain

well controlled, Reports ambulating normally


: doing well, in NICU





Objective





- Vital Signs


Latest vital signs: 


                                   Vital Signs











  Temp Pulse Resp BP Pulse Ox


 


 12/15/19 08:00  98.9 F  62  15  133/76  100


 


 12/15/19 00:00  98.2 F  64  16  117/72 


 


 19 16:00  98 F  72  15  119/74  98














- Exam


Abdomen: Present: normal appearance, soft.  Absent: distention, tenderness


Incision: Present: normal, dry, intact.  Absent: erythematous


Uterus: Present: normal, firm.  Absent: tenderness





Assessment and Plan


(1) Previous  section


Current Visit: Yes   Status: Acute   Code(s): Z98.891 - HISTORY OF UTERINE SCAR 

FROM PREVIOUS SURGERY   SNOMED Code(s): 531621915


   





(2) Term pregnancy


Current Visit: Yes   Status: Acute   Code(s): Z34.80 - ENCOUNTER FOR SUPRVSN OF 

NORMAL PREGNANCY, UNSP TRIMESTER   SNOMED Code(s): 52757632


   





(3) Gestational diabetes


Current Visit: Yes   Status: Acute   Code(s): O24.419 - GESTATIONAL DIABETES 

MELLITUS IN PREGNANCY, UNSP CONTROL   SNOMED Code(s): 23312234


   


Plan: 





postop day 3 status post repeat low transverse  section.  Recovering 

well.  Anticipate discharge home tomorrow.

## 2019-12-16 VITALS — DIASTOLIC BLOOD PRESSURE: 68 MMHG | SYSTOLIC BLOOD PRESSURE: 135 MMHG | HEART RATE: 70 BPM

## 2019-12-16 VITALS — RESPIRATION RATE: 17 BRPM | TEMPERATURE: 97.9 F

## 2019-12-16 RX ADMIN — DOCUSATE SODIUM AND SENNOSIDES SCH: 50; 8.6 TABLET ORAL at 01:29

## 2019-12-16 RX ADMIN — DOCUSATE SODIUM AND SENNOSIDES SCH: 50; 8.6 TABLET ORAL at 08:12

## 2019-12-16 RX ADMIN — ACETAMINOPHEN PRN MG: 325 TABLET, FILM COATED ORAL at 01:37

## 2019-12-16 RX ADMIN — IBUPROFEN PRN MG: 600 TABLET ORAL at 08:11

## 2019-12-16 RX ADMIN — IBUPROFEN PRN MG: 600 TABLET ORAL at 14:43

## 2019-12-16 NOTE — P.DS
Providers


Date of admission: 


19 10:17





Expected date of discharge: 19


Attending physician: 


Earl Flores





Primary care physician: 


Edna Sharma








- Discharge Diagnosis(es)


(1) Previous  section


Current Visit: Yes   Status: Acute   





(2) Term pregnancy


Current Visit: Yes   Status: Acute   





(3) S/P  section


Current Visit: Yes   Status: Acute   


Hospital Course: 





the patient is a 34-year-old  2 para 1001 admitted at 39-2/7 weeks by 

good dating parameters.  She is admitted for repeat low transverse  

section.  Her pregnancy has been complicated by gestational diabetes for which 

she had only moderate blood sugar control.  Group B strep status is negative.  

On labor and delivery, she was taken the operating room where she underwent 

repeat low transverse  section in a uncomplicated fashion and was 

delivered of a viable 7 lbs. 12 oz. baby boy with Apgars of 3 at 1 minute 6 at 5

minutes and 7 at 10 minutes.  For reasons that are unclear, the infant had 

initial problems with breathing and was ultimately taken to the special care 

nursery where he remains today with for ongoing treatment though he is 

improving.  The patient's postoperative course was essentially unremarkable 

vital signs remaining stable and her temperature was afebrile throughout.  She 

was deemed stable for discharge on postpartum day number for an postoperative 

day #4.  She was discharged home to follow-up in the office in 2 weeks for 

incision check and 6 weeks routinely.  Discharge instructions included calling 

for any significantly increased bleeding or foul-smelling lochia, significantly 

increased fever abdominal pain, perineal complaints, breast complaints, 

incisional complaints, or anything else that concerned her.  She was 

additionally instructed to have nothing in the vagina for at least 6 weeks time 

to include intercourse and to abstain from any heavy lifting over the same 

period of time.  She was lastly instructed to do no driving until off of all 

pain medications or 2 weeks' time, whichever came first.  She understood her 

instructions and agrees follow up as noted above.  Discharge medications 

included only over-the-counter analgesic pain medications as she declines any 

narcotic medications.  Maternal blood type is A+ and rubella status is immune.  

Discharge hemoglobin and hematocrit were10.4 and 31.4 respectively.


Procedures: 





#1.  Repeat low transverse  section


Patient Condition at Discharge: Good





Plan - Discharge Summary


Discharge Rx Participant: No


New Discharge Prescriptions: 


No Action


   Levothyroxine Sodium [Synthroid] 75 mcg PO DAILY


   Pnv,Calcium 72/Iron/Folic Acid [Prenatal Plus Tablet] 1 tab PO HS


   Sertraline [Zoloft] 150 mg PO DAILY


   Ferrous Sulfate [Feosol] 325 mg PO HS


Discharge Medication List





Levothyroxine Sodium [Synthroid] 75 mcg PO DAILY 09/05/15 [History]


Pnv,Calcium 72/Iron/Folic Acid [Prenatal Plus Tablet] 1 tab PO HS 09/05/15 

[History]


Ferrous Sulfate [Feosol] 325 mg PO HS 10/31/19 [History]


Sertraline [Zoloft] 150 mg PO DAILY 10/31/19 [History]








Follow up Appointment(s)/Referral(s): 


Earl Flores MD [STAFF PHYSICIAN] - 1 Week


Discharge Disposition: HOME SELF-CARE

## 2019-12-17 ENCOUNTER — HOSPITAL ENCOUNTER (EMERGENCY)
Dept: HOSPITAL 47 - EC | Age: 34
Discharge: HOME | End: 2019-12-17
Payer: COMMERCIAL

## 2019-12-17 VITALS — HEART RATE: 60 BPM

## 2019-12-17 VITALS — TEMPERATURE: 98 F

## 2019-12-17 VITALS — SYSTOLIC BLOOD PRESSURE: 140 MMHG | DIASTOLIC BLOOD PRESSURE: 92 MMHG

## 2019-12-17 VITALS — RESPIRATION RATE: 20 BRPM

## 2019-12-17 DIAGNOSIS — R06.00: ICD-10-CM

## 2019-12-17 DIAGNOSIS — Z79.899: ICD-10-CM

## 2019-12-17 DIAGNOSIS — E07.9: ICD-10-CM

## 2019-12-17 DIAGNOSIS — Z88.8: ICD-10-CM

## 2019-12-17 DIAGNOSIS — R06.02: Primary | ICD-10-CM

## 2019-12-17 DIAGNOSIS — R07.9: ICD-10-CM

## 2019-12-17 DIAGNOSIS — F32.9: ICD-10-CM

## 2019-12-17 LAB
ALBUMIN SERPL-MCNC: 3.9 G/DL (ref 3.5–5)
ALP SERPL-CCNC: 132 U/L (ref 38–126)
ALT SERPL-CCNC: 19 U/L (ref 4–34)
ANION GAP SERPL CALC-SCNC: 10 MMOL/L
APTT BLD: 23.6 SEC (ref 22–30)
AST SERPL-CCNC: 27 U/L (ref 14–36)
BASOPHILS # BLD AUTO: 0 K/UL (ref 0–0.2)
BASOPHILS NFR BLD AUTO: 0 %
BUN SERPL-SCNC: 22 MG/DL (ref 7–17)
CALCIUM SPEC-MCNC: 10 MG/DL (ref 8.4–10.2)
CHLORIDE SERPL-SCNC: 106 MMOL/L (ref 98–107)
CO2 SERPL-SCNC: 26 MMOL/L (ref 22–30)
D DIMER PPP FEU-MCNC: 3.89 MG/L FEU (ref ?–0.6)
EOSINOPHIL # BLD AUTO: 0.2 K/UL (ref 0–0.7)
EOSINOPHIL NFR BLD AUTO: 2 %
ERYTHROCYTE [DISTWIDTH] IN BLOOD BY AUTOMATED COUNT: 4.04 M/UL (ref 3.8–5.4)
ERYTHROCYTE [DISTWIDTH] IN BLOOD: 14.4 % (ref 11.5–15.5)
GLUCOSE SERPL-MCNC: 85 MG/DL (ref 74–99)
HCT VFR BLD AUTO: 38.3 % (ref 34–46)
HGB BLD-MCNC: 12.8 GM/DL (ref 11.4–16)
INR PPP: 0.8 (ref ?–1.2)
LYMPHOCYTES # SPEC AUTO: 2.8 K/UL (ref 1–4.8)
LYMPHOCYTES NFR SPEC AUTO: 30 %
MCH RBC QN AUTO: 31.8 PG (ref 25–35)
MCHC RBC AUTO-ENTMCNC: 33.5 G/DL (ref 31–37)
MCV RBC AUTO: 95 FL (ref 80–100)
MONOCYTES # BLD AUTO: 0.3 K/UL (ref 0–1)
MONOCYTES NFR BLD AUTO: 3 %
NEUTROPHILS # BLD AUTO: 6 K/UL (ref 1.3–7.7)
NEUTROPHILS NFR BLD AUTO: 64 %
PH UR: 6.5 [PH] (ref 5–8)
PLATELET # BLD AUTO: 295 K/UL (ref 150–450)
POTASSIUM SERPL-SCNC: 4.4 MMOL/L (ref 3.5–5.1)
PROT SERPL-MCNC: 6.9 G/DL (ref 6.3–8.2)
PT BLD: 9.3 SEC (ref 9–12)
RBC UR QL: 114 /HPF (ref 0–5)
SODIUM SERPL-SCNC: 142 MMOL/L (ref 137–145)
SP GR UR: 1.01 (ref 1–1.03)
SQUAMOUS UR QL AUTO: 1 /HPF (ref 0–4)
UROBILINOGEN UR QL STRIP: <2 MG/DL (ref ?–2)
WBC # BLD AUTO: 9.4 K/UL (ref 3.8–10.6)
WBC # UR AUTO: 12 /HPF (ref 0–5)

## 2019-12-17 PROCEDURE — 93005 ELECTROCARDIOGRAM TRACING: CPT

## 2019-12-17 PROCEDURE — 87086 URINE CULTURE/COLONY COUNT: CPT

## 2019-12-17 PROCEDURE — 81001 URINALYSIS AUTO W/SCOPE: CPT

## 2019-12-17 PROCEDURE — 85730 THROMBOPLASTIN TIME PARTIAL: CPT

## 2019-12-17 PROCEDURE — 84484 ASSAY OF TROPONIN QUANT: CPT

## 2019-12-17 PROCEDURE — 99285 EMERGENCY DEPT VISIT HI MDM: CPT

## 2019-12-17 PROCEDURE — 36415 COLL VENOUS BLD VENIPUNCTURE: CPT

## 2019-12-17 PROCEDURE — 85025 COMPLETE CBC W/AUTO DIFF WBC: CPT

## 2019-12-17 PROCEDURE — 85379 FIBRIN DEGRADATION QUANT: CPT

## 2019-12-17 PROCEDURE — 71275 CT ANGIOGRAPHY CHEST: CPT

## 2019-12-17 PROCEDURE — 71046 X-RAY EXAM CHEST 2 VIEWS: CPT

## 2019-12-17 PROCEDURE — 80053 COMPREHEN METABOLIC PANEL: CPT

## 2019-12-17 PROCEDURE — 85610 PROTHROMBIN TIME: CPT

## 2019-12-17 NOTE — XR
EXAMINATION TYPE: XR chest 2V

 

DATE OF EXAM: 12/17/2019

 

COMPARISON: NONE

 

HISTORY: Difficulty breathing and chest pain.

 

TECHNIQUE:  Frontal and lateral views of the chest are obtained.

 

FINDINGS: Overlying EKG leads. There is no focal air space opacity, pleural effusion, or pneumothorax
 seen.  The cardiac silhouette size is within normal limits.   The osseous structures are intact.

 

IMPRESSION:  No acute cardiopulmonary process.

## 2019-12-17 NOTE — ED
SOB HPI





- General


Chief Complaint: Shortness of Breath


Stated Complaint: ESTELLA, C section 5 days ago, chest pain, back pain


Time Seen by Provider: 19 14:46


Source: patient


Mode of arrival: ambulatory


Limitations: no limitations





- History of Present Illness


Initial Comments: 





Patient is a 34-year-old female presenting to the emergency Department with 

complaints of shortness of breath that has been increasing since yesterday.  

Patient recently had a  5 days ago with Dr. Flores.   and 

birth were uncomplicated.  During hospital stay patient developed chest pain 

that was worked up in no acute findings were found.  They told her it could be a

muscle related or anxiety.  Patient states she was discharged from the hospital 

yesterday but returns today for increasing shortness of breath with activity as 

well as intermittent chest pain that she describes as sharp in nature.  Patient 

states his episodes last for a couple minutes.  Patient is not currently having 

chest pain.  Patient states she has been eating and drinking as normal.  Patient

states she did call her OB/GYN today who recommended coming to the ER for 

evaluation.  Patient denies history of DVT or PE, or heart disease.  Patient 

denies fever, chills, nausea, vomiting.  Patient has no other complaints at this

time.  Upon arrival to the ER, vital signs are stable.





- Related Data


                                Home Medications











 Medication  Instructions  Recorded  Confirmed


 


Levothyroxine Sodium [Synthroid] 75 mcg PO DAILY 09/05/15 12/17/19


 


Pnv,Calcium 72/Iron/Folic Acid 1 tab PO DAILY 09/05/15 12/17/19





[Prenatal Plus Tablet]   


 


Ferrous Sulfate [Feosol] 325 mg PO HS 10/31/19 12/17/19


 


Sertraline [Zoloft] 150 mg PO DAILY 10/31/19 12/17/19











                                    Allergies











Allergy/AdvReac Type Severity Reaction Status Date / Time


 


butorphanol [From Stadol] Allergy  PASSED OUT Verified 19 17:39














Review of Systems


ROS Statement: 


Those systems with pertinent positive or pertinent negative responses have been 

documented in the HPI.





ROS Other: All systems not noted in ROS Statement are negative.





Past Medical History


Past Medical History: GERD/Reflux, Hyperlipidemia, Thyroid Disorder


Additional Past Medical History / Comment(s): migraines, refux during pregnancy,

diarrhea, hx kidney stones during pregnacy


History of Any Multi-Drug Resistant Organisms: None Reported


Past Surgical History: Breast Surgery,  Section


Additional Past Surgical History / Comment(s): dorothy breast reduction, carpal 

tunnel surgery


Past Anesthesia/Blood Transfusion Reactions: Motion Sickness


Additional Past Anesthesia/Blood Transfusion Reaction / Comment(s): had epidural

for C/S- "had cholinergic episode requiring atropine during epidural". was  

unresponsive and slept right through labor. possibly from stadol per pt.


Past Psychological History: Depression


Smoking Status: Never smoker


Past Alcohol Use History: None Reported


Past Drug Use History: None Reported





- Past Family History


  ** Mother


Family Medical History: No Reported History





  ** Father


Family Medical History: Cancer, Deep Vein Thrombosis (DVT), Myocardial 

Infarction (MI)


Additional Family Medical History / Comment(s): .





General Exam





- General Exam Comments


Initial Comments: 





GENERAL: 


Well-appearing, well-nourished and in no acute distress.





HEAD: 


Atraumatic, normocephalic.





EYES:


Pupils equal round and reactive to light, extraocular movements intact, sclera 

anicteric, conjunctiva are normal.





ENT: 


TMs normal, nares patent, oropharynx clear without exudates.  Moist mucous 

membranes.





NECK: 


Normal range of motion, supple without lymphadenopathy or JVD.





LUNGS:


 Breath sounds clear to auscultation bilaterally and equal.  No wheezes rales or

rhonchi.





HEART:


Regular rate and rhythm without murmurs, rubs or gallops.





ABDOMEN: 


Soft, nontender, normoactive bowel sounds.  No guarding, no rebound.  No masses 

appreciated.  There is a suprapubic incision from a recent .  Incision 

is clean, dry, intact, no signs of infection.





: Deferred 





EXTREMITIES: 


Normal range of motion, no pitting or edema.  No clubbing or cyanosis.





NEUROLOGICAL: 


Cranial nerves II through XII grossly intact.  Normal speech, normal gait.





PSYCH:


Normal mood, normal affect.





SKIN:


 Warm, Dry, normal turgor, no rashes or lesions noted.


Limitations: no limitations





Course


                                   Vital Signs











  19





  14:32 15:26 15:30


 


Temperature 98 F  


 


Pulse Rate 72 57 L 60


 


Respiratory 18 20 18





Rate   


 


Blood Pressure 128/81  130/88


 


O2 Sat by Pulse 98 98 98





Oximetry   














  19





  15:33 16:00 16:30


 


Temperature   


 


Pulse Rate   


 


Respiratory 20 20 





Rate   


 


Blood Pressure  120/79 120/79


 


O2 Sat by Pulse 98 98 





Oximetry   














  19





  17:00 17:30 18:00


 


Temperature   


 


Pulse Rate   


 


Respiratory 20  20





Rate   


 


Blood Pressure 122/71  140/92


 


O2 Sat by Pulse 98 98 97





Oximetry   














  19





  18:14


 


Temperature 


 


Pulse Rate 


 


Respiratory 20





Rate 


 


Blood Pressure 


 


O2 Sat by Pulse 





Oximetry 














Medical Decision Making





- Medical Decision Making





Patient is a 34-year-old female presenting for shortness of breath as well as 

intermittent chest pain since yesterday.  Patient had  5 days ago.  No 

history of DVT, PE, heart disease.  D-dimer was 3.89, troponin is normal.  Rest 

of lab work shows no acute abnormalities.  Chest x-ray shows no acute 

abnormalities.  Chest CTA shows no acute process.  EKG was normal.  I discussed 

these findings with the patient.  Patient is stable for discharge at this time. 

Patient will follow up with her PCP if symptoms persist.  Patient is agreement 

with this plan of care.  Return parameters were discussed with the patient and 

she verbalized understanding.  Case discussed with Dr. Brooke.





- Lab Data


Result diagrams: 


                                 19 15:20





                                 19 15:20


                                   Lab Results











  19 Range/Units





  15:20 15:20 15:20 


 


WBC  9.4    (3.8-10.6)  k/uL


 


RBC  4.04    (3.80-5.40)  m/uL


 


Hgb  12.8    (11.4-16.0)  gm/dL


 


Hct  38.3    (34.0-46.0)  %


 


MCV  95.0    (80.0-100.0)  fL


 


MCH  31.8    (25.0-35.0)  pg


 


MCHC  33.5    (31.0-37.0)  g/dL


 


RDW  14.4    (11.5-15.5)  %


 


Plt Count  295    (150-450)  k/uL


 


Neutrophils %  64    %


 


Lymphocytes %  30    %


 


Monocytes %  3    %


 


Eosinophils %  2    %


 


Basophils %  0    %


 


Neutrophils #  6.0    (1.3-7.7)  k/uL


 


Lymphocytes #  2.8    (1.0-4.8)  k/uL


 


Monocytes #  0.3    (0-1.0)  k/uL


 


Eosinophils #  0.2    (0-0.7)  k/uL


 


Basophils #  0.0    (0-0.2)  k/uL


 


PT    9.3  (9.0-12.0)  sec


 


INR    0.8  (<1.2)  


 


APTT    23.6  (22.0-30.0)  sec


 


D-Dimer    3.89 H  (<0.60)  mg/L FEU


 


Sodium   142   (137-145)  mmol/L


 


Potassium   4.4   (3.5-5.1)  mmol/L


 


Chloride   106   ()  mmol/L


 


Carbon Dioxide   26   (22-30)  mmol/L


 


Anion Gap   10   mmol/L


 


BUN   22 H   (7-17)  mg/dL


 


Creatinine   0.88   (0.52-1.04)  mg/dL


 


Est GFR (CKD-EPI)AfAm   >90   (>60 ml/min/1.73 sqM)  


 


Est GFR (CKD-EPI)NonAf   87   (>60 ml/min/1.73 sqM)  


 


Glucose   85   (74-99)  mg/dL


 


Calcium   10.0   (8.4-10.2)  mg/dL


 


Total Bilirubin   0.4   (0.2-1.3)  mg/dL


 


AST   27   (14-36)  U/L


 


ALT   19   (4-34)  U/L


 


Alkaline Phosphatase   132 H   ()  U/L


 


Troponin I     (0.000-0.034)  ng/mL


 


Total Protein   6.9   (6.3-8.2)  g/dL


 


Albumin   3.9   (3.5-5.0)  g/dL


 


Urine Color     


 


Urine Appearance     (Clear)  


 


Urine pH     (5.0-8.0)  


 


Ur Specific Gravity     (1.001-1.035)  


 


Urine Protein     (Negative)  


 


Urine Glucose (UA)     (Negative)  


 


Urine Ketones     (Negative)  


 


Urine Blood     (Negative)  


 


Urine Nitrite     (Negative)  


 


Urine Bilirubin     (Negative)  


 


Urine Urobilinogen     (<2.0)  mg/dL


 


Ur Leukocyte Esterase     (Negative)  


 


Urine RBC     (0-5)  /hpf


 


Urine WBC     (0-5)  /hpf


 


Ur Squamous Epith Cells     (0-4)  /hpf














  19 Range/Units





  15:20 15:20 


 


WBC    (3.8-10.6)  k/uL


 


RBC    (3.80-5.40)  m/uL


 


Hgb    (11.4-16.0)  gm/dL


 


Hct    (34.0-46.0)  %


 


MCV    (80.0-100.0)  fL


 


MCH    (25.0-35.0)  pg


 


MCHC    (31.0-37.0)  g/dL


 


RDW    (11.5-15.5)  %


 


Plt Count    (150-450)  k/uL


 


Neutrophils %    %


 


Lymphocytes %    %


 


Monocytes %    %


 


Eosinophils %    %


 


Basophils %    %


 


Neutrophils #    (1.3-7.7)  k/uL


 


Lymphocytes #    (1.0-4.8)  k/uL


 


Monocytes #    (0-1.0)  k/uL


 


Eosinophils #    (0-0.7)  k/uL


 


Basophils #    (0-0.2)  k/uL


 


PT    (9.0-12.0)  sec


 


INR    (<1.2)  


 


APTT    (22.0-30.0)  sec


 


D-Dimer    (<0.60)  mg/L FEU


 


Sodium    (137-145)  mmol/L


 


Potassium    (3.5-5.1)  mmol/L


 


Chloride    ()  mmol/L


 


Carbon Dioxide    (22-30)  mmol/L


 


Anion Gap    mmol/L


 


BUN    (7-17)  mg/dL


 


Creatinine    (0.52-1.04)  mg/dL


 


Est GFR (CKD-EPI)AfAm    (>60 ml/min/1.73 sqM)  


 


Est GFR (CKD-EPI)NonAf    (>60 ml/min/1.73 sqM)  


 


Glucose    (74-99)  mg/dL


 


Calcium    (8.4-10.2)  mg/dL


 


Total Bilirubin    (0.2-1.3)  mg/dL


 


AST    (14-36)  U/L


 


ALT    (4-34)  U/L


 


Alkaline Phosphatase    ()  U/L


 


Troponin I  <0.012   (0.000-0.034)  ng/mL


 


Total Protein    (6.3-8.2)  g/dL


 


Albumin    (3.5-5.0)  g/dL


 


Urine Color   Light Yellow  


 


Urine Appearance   Clear  (Clear)  


 


Urine pH   6.5  (5.0-8.0)  


 


Ur Specific Gravity   1.007  (1.001-1.035)  


 


Urine Protein   Negative  (Negative)  


 


Urine Glucose (UA)   Negative  (Negative)  


 


Urine Ketones   Negative  (Negative)  


 


Urine Blood   Moderate H  (Negative)  


 


Urine Nitrite   Negative  (Negative)  


 


Urine Bilirubin   Negative  (Negative)  


 


Urine Urobilinogen   <2.0  (<2.0)  mg/dL


 


Ur Leukocyte Esterase   Moderate H  (Negative)  


 


Urine RBC   114 H  (0-5)  /hpf


 


Urine WBC   12 H  (0-5)  /hpf


 


Ur Squamous Epith Cells   1  (0-4)  /hpf














- EKG Data


EKG Comments: 





Ventricular rate 59, P or interval 114, .  Sinus bradycardia with sinus 

arrhythmia.  No acute ST segment changes.





Disposition


Clinical Impression: 


 Shortness of breath





Disposition: HOME SELF-CARE


Condition: Stable


Instructions (If sedation given, give patient instructions):  Shortness of 

Breath (ED)


Additional Instructions: 


Please return to the Emergency Department if symptoms worsen or any other 

concerns.


Follow-up with PCP.


Is patient prescribed a controlled substance at d/c from ED?: No


Referrals: 


Edna Sharma MD [Primary Care Provider] - 1-2 days

## 2019-12-17 NOTE — CT
EXAMINATION TYPE: CT chest angio for PE w con and with 3-D reconstruction renderings.

 

DATE OF EXAM: 12/17/2019

 

COMPARISON: None

 

HISTORY: Shortness of breath. C section 5 days ago.

 

CT DLP: 291.8 mGycm

Automated exposure control for dose reduction was used.

 

CONTRAST: CT Chest for pulmonary embolism performed with with IV Contrast, patient injected with 100 
mL of Isovue 370. Three-D reconstructions.

 

FINDINGS:

 

LUNGS: The lungs are grossly clear, there is no concerning parenchymal mass or nodule identified.   T
here is no pleural effusion or pneumothorax seen.  The tracheobronchial tree is patent.

 

MEDIASTINUM: There is satisfactory enhancement of the pulmonary artery and its branches, with no CT e
vidence for pulmonary embolism. There are no acute aortic findings. No cardiomegaly or pericardial ef
fusion. There are no greater than 1 cm hilar or mediastinal lymph nodes.   

 

OTHER:  No additional significant abnormality is seen.

 

IMPRESSION:

No acute process.

## 2021-06-18 ENCOUNTER — HOSPITAL ENCOUNTER (EMERGENCY)
Dept: HOSPITAL 47 - EC | Age: 36
Discharge: HOME | End: 2021-06-18
Payer: COMMERCIAL

## 2021-06-18 VITALS
HEART RATE: 101 BPM | DIASTOLIC BLOOD PRESSURE: 80 MMHG | SYSTOLIC BLOOD PRESSURE: 119 MMHG | TEMPERATURE: 98.4 F | RESPIRATION RATE: 18 BRPM

## 2021-06-18 DIAGNOSIS — F32.9: ICD-10-CM
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
## 2021-06-18 DIAGNOSIS — E07.9: ICD-10-CM

## 2021-06-18 DIAGNOSIS — E78.5: ICD-10-CM

## 2021-06-18 DIAGNOSIS — S81.051A: Primary | ICD-10-CM

## 2021-06-18 DIAGNOSIS — Z87.442: ICD-10-CM

## 2021-06-18 PROCEDURE — 90375 RABIES IG IM/SC: CPT

## 2021-06-18 PROCEDURE — 90675 RABIES VACCINE IM: CPT

## 2021-06-18 PROCEDURE — 99282 EMERGENCY DEPT VISIT SF MDM: CPT

## 2021-06-18 PROCEDURE — 96372 THER/PROPH/DIAG INJ SC/IM: CPT

## 2021-06-18 NOTE — ED
Animal Bite HPI





- General


Chief Complaint: Animal Bite


Stated Complaint: Dog bite, arm & knee, [facility]


Time Seen by Provider: 21 17:57


Source: patient, EMS


Mode of arrival: ambulatory


Limitations: no limitations





- History of Present Illness


Initial Comments: 





36-year-old female presents to the emergency department with chief complaint of 

a dog bite that occurred about one hour prior to arrival.  Patient reports her 

tetanus is up-to-date.  States she was bitten on the right knee.  States she 

thoroughly washed out the wound with soapy water.  States she is a  

and has been bitten many times before and took Augmentin for.  Patient otherwise

reports full range of motion but states there is pain where the skin break is 

occurring.  Reports minimal bleeding from the site.  Denies any paresthesias or 

limited range of motion in the extremity.  Patient is unsure whether the dog had

rabies vaccination.





- Related Data


                                Home Medications











 Medication  Instructions  Recorded  Confirmed


 


Levothyroxine Sodium [Synthroid] 75 mcg PO DAILY 09/05/15 12/17/19


 


Pnv,Calcium 72/Iron/Folic Acid 1 tab PO DAILY 09/05/15 12/17/19





[Prenatal Plus Tablet]   


 


Ferrous Sulfate [Feosol] 325 mg PO HS 10/31/19 12/17/19


 


Sertraline [Zoloft] 150 mg PO DAILY 10/31/19 12/17/19








                                  Previous Rx's











 Medication  Instructions  Recorded


 


Amoxicillin/Potassium Clav 1 tab PO Q12HR #20 tab 21





[Augmentin 875-125 Tablet]  











                                    Allergies











Allergy/AdvReac Type Severity Reaction Status Date / Time


 


butorphanol [From Stadol] Allergy  PASSED OUT Verified 21 17:44


 


lamotrigine [From Lamictal] Allergy  Rash/Hives Verified 21 17:45














Review of Systems


ROS Statement: 


Those systems with pertinent positive or pertinent negative responses have been 

documented in the HPI.





ROS Other: All systems not noted in ROS Statement are negative.





Past Medical History


Past Medical History: GERD/Reflux, Hyperlipidemia, Thyroid Disorder


Additional Past Medical History / Comment(s): migraines, refux during pregnancy,

diarrhea, hx kidney stones during pregnacy


History of Any Multi-Drug Resistant Organisms: None Reported


Past Surgical History: Breast Surgery,  Section


Additional Past Surgical History / Comment(s): dorothy breast reduction, carpal 

tunnel surgery


Past Anesthesia/Blood Transfusion Reactions: Motion Sickness


Additional Past Anesthesia/Blood Transfusion Reaction / Comment(s): had epidural

for C/S- "had cholinergic episode requiring atropine during epidural". was  

unresponsive and slept right through labor. possibly from stadol per pt.


Past Psychological History: Depression


Past Alcohol Use History: None Reported


Past Drug Use History: None Reported





- Past Family History


  ** Mother


Family Medical History: No Reported History





  ** Father


Family Medical History: Cancer, Deep Vein Thrombosis (DVT), Myocardial 

Infarction (MI)


Additional Family Medical History / Comment(s): .





General Exam


Limitations: no limitations


General appearance: alert, in no apparent distress, obese


Head exam: Present: atraumatic, normocephalic, normal inspection


Eye exam: Present: normal appearance, PERRL, EOMI


Pupils: Present: normal accommodation


ENT exam: Present: normal exam, normal oropharynx, mucous membranes moist


Neck exam: Present: normal inspection, full ROM.  Absent: tenderness, 

meningismus


Respiratory exam: Present: normal lung sounds bilaterally.  Absent: respiratory 

distress


Cardiovascular Exam: Present: regular rate, normal rhythm, normal heart sounds. 

Absent: systolic murmur


Extremities exam: Present: normal inspection, full ROM, normal capillary refill.

 Absent: tenderness, pedal edema


Back exam: Present: normal inspection, full ROM.  Absent: tenderness


Neurological exam: Present: alert, oriented X3


Psychiatric exam: Present: normal affect, normal mood


Skin exam: Present: warm, dry, intact, normal color





Course


                                   Vital Signs











  21





  17:40


 


Temperature 98.4 F


 


Pulse Rate 101 H


 


Respiratory 18





Rate 


 


Blood Pressure 119/80


 


O2 Sat by Pulse 99





Oximetry 














Medical Decision Making





- Medical Decision Making





36-year-old female presents to the emergency department with a chief complaint 

of dog bite.  On physical examination, 1 cm laceration on the anterior aspect of

her right knee.  She also has a small abrasion on the right proximal forearm.  

Patient reports some pain at the injury site.  States she was thoroughly 

cleaned.  I did perform additional irrigation with normal saline.  Patient was 

given immunoglobulin directly into the wound site as well as I am into the arm. 

She was started on a vaccine for rabies.  She will have to get another dose on 

day 3, 7, 14.  Strict return problems with thoroughly discussed the patient was 

an ascending agreeable.  Patient will also be started on Augmentin.  Case 

discussed with Dr. Brooke.





Disposition


Clinical Impression: 


 Dog bite, Bite by animal





Disposition: HOME SELF-CARE


Condition: Stable


Instructions (If sedation given, give patient instructions):  Animal Bite (ED)


Additional Instructions: 


Continue with the vaccine series for rabies.  Take prescribed medication as 

directed.  Return to emergency department is symptoms worsen.


Prescriptions: 


Amoxicillin/Potassium Clav [Augmentin 875-125 Tablet] 1 tab PO Q12HR #20 tab


Is patient prescribed a controlled substance at d/c from ED?: No


Referrals: 


Edna Sharma MD [Primary Care Provider] - 1-2 days


Time of Disposition: 19:01

## 2021-07-23 ENCOUNTER — HOSPITAL ENCOUNTER (OUTPATIENT)
Dept: HOSPITAL 47 - LABWHC1 | Age: 36
Discharge: HOME | End: 2021-07-23
Attending: FAMILY MEDICINE
Payer: COMMERCIAL

## 2021-07-23 DIAGNOSIS — N92.5: Primary | ICD-10-CM

## 2021-07-23 DIAGNOSIS — R58: ICD-10-CM

## 2021-07-23 PROCEDURE — 85384 FIBRINOGEN ACTIVITY: CPT

## 2021-07-23 PROCEDURE — 84146 ASSAY OF PROLACTIN: CPT

## 2021-07-23 PROCEDURE — 83002 ASSAY OF GONADOTROPIN (LH): CPT

## 2021-07-23 PROCEDURE — 83001 ASSAY OF GONADOTROPIN (FSH): CPT

## 2021-07-23 PROCEDURE — 36415 COLL VENOUS BLD VENIPUNCTURE: CPT

## 2021-07-24 LAB — FSH SERPL-ACNC: 8.1 MIU/ML

## 2023-06-12 NOTE — ED
Medical Decision Making





- Medical Decision Making





Constitutional: NAD, AOX3, Pt has pleasant affect. 


HEENT: NC/AT, trachea midline, neck supple, no lymphadenopathy. Posterior 

pharynx non erythematous, without exudates. External ears appear normal, without

discharge. Mucous membranes moist. Eyes PERRLA, EOM intact. There is no scleral 

icterus. No pallor noted. 


Cardiopulmonary: RRR, no murmurs, rubs or gallops, no JVD noted. Lungs CTAB in 

anterior and posterior fields. No peripheral edema. 


Abdominal exam: Abdomen soft and non-distended. Abdomen non-tender to palpation 

in all 4 quadrants. Bowel sounds active in LLQ. No hepatosplenomegaly. No 

ecchymosis


Neuro: CN II-XII grossly intact. No nuchal rigidity. No raccon eyes, no benoit 

sign, no hemotympanum. No cervical spinal tenderness. 


MSK: Fourth DIP joint of right hand mildly tender and swollen.  No erythema.  

Range of motion is intact of all digits MCP PIP/DIP joints.  Sensation intact.  

Capillary refill less than 2 seconds.  No snuffbox tenderness.  No posterior 

calf tenderness bilaterally, homans sign negative bilaterally. Posterior 

tibialis and radial pulse +2 bilaterally. Sensation intact in upper and lower 

extremities. Full active ROM in upper and lower extremities, 5/5 stregnth. 


Derm: Healing bites noted on lips, anterior nose region.  Non-erythematous, no 

discharge, no signs of infection, no streaking.











Disposition


Clinical Impression: 


 Encounter for wound re-check, Finger pain, right





Disposition: HOME SELF-CARE


Condition: Stable


Instructions (If sedation given, give patient instructions):  Finger Sprain (ED)


Additional Instructions: 


Follow-up with primary care provider next week.  Follow up with orthopedic 

consult of symptoms and hand persists.  Monitor for signs and symptoms of 

infection of dog bites.  If any of these develop return immediately to ER.





Please monitor for signs and symptoms of infection including: redness, warmth, 

drainage, discharge. 





Please return to ED if these signs or symptoms occur, new signs or symptoms 

develop or if condition worsens in anyway. 


Is patient prescribed a controlled substance at d/c from ED?: No


Referrals: 


Edna Sharma MD [Primary Care Provider] - 1-2 days


Tye Fowler DO [Medical Doctor] - 1-2 days Suturegard Intro: Intraoperative tissue expansion was performed, utilizing the SUTUREGARD device, in order to reduce wound tension.